# Patient Record
Sex: FEMALE | Race: BLACK OR AFRICAN AMERICAN | Employment: FULL TIME | URBAN - METROPOLITAN AREA
[De-identification: names, ages, dates, MRNs, and addresses within clinical notes are randomized per-mention and may not be internally consistent; named-entity substitution may affect disease eponyms.]

---

## 2022-03-12 ENCOUNTER — HOSPITAL ENCOUNTER (EMERGENCY)
Age: 52
Discharge: HOME | End: 2022-03-12
Attending: EMERGENCY MEDICINE
Payer: COMMERCIAL

## 2022-03-12 ENCOUNTER — APPOINTMENT (OUTPATIENT)
Dept: CT IMAGING | Age: 52
End: 2022-03-12
Attending: EMERGENCY MEDICINE
Payer: COMMERCIAL

## 2022-03-12 VITALS
TEMPERATURE: 98 F | HEIGHT: 62 IN | SYSTOLIC BLOOD PRESSURE: 102 MMHG | OXYGEN SATURATION: 98 % | BODY MASS INDEX: 32.76 KG/M2 | RESPIRATION RATE: 16 BRPM | WEIGHT: 178 LBS | HEART RATE: 73 BPM | DIASTOLIC BLOOD PRESSURE: 64 MMHG

## 2022-03-12 DIAGNOSIS — R10.814 LEFT LOWER QUADRANT ABDOMINAL TENDERNESS WITHOUT REBOUND TENDERNESS: Primary | ICD-10-CM

## 2022-03-12 LAB
ALBUMIN SERPL-MCNC: 3.7 G/DL (ref 3.5–5)
ALBUMIN/GLOB SERPL: 1 {RATIO} (ref 1.1–2.2)
ALP SERPL-CCNC: 90 U/L (ref 45–117)
ALT SERPL-CCNC: 21 U/L (ref 12–78)
ANION GAP SERPL CALC-SCNC: 6 MMOL/L (ref 5–15)
APPEARANCE UR: ABNORMAL
AST SERPL-CCNC: 15 U/L (ref 15–37)
BACTERIA URNS QL MICRO: ABNORMAL /HPF
BASOPHILS # BLD: 0 K/UL (ref 0–0.1)
BASOPHILS NFR BLD: 1 % (ref 0–1)
BILIRUB SERPL-MCNC: 0.4 MG/DL (ref 0.2–1)
BILIRUB UR QL: NEGATIVE
BUN SERPL-MCNC: 13 MG/DL (ref 6–20)
BUN/CREAT SERPL: 14 (ref 12–20)
CALCIUM SERPL-MCNC: 9 MG/DL (ref 8.5–10.1)
CHLORIDE SERPL-SCNC: 105 MMOL/L (ref 97–108)
CO2 SERPL-SCNC: 28 MMOL/L (ref 21–32)
COLOR UR: ABNORMAL
COMMENT, HOLDF: NORMAL
CREAT SERPL-MCNC: 0.9 MG/DL (ref 0.55–1.02)
DIFFERENTIAL METHOD BLD: ABNORMAL
EOSINOPHIL # BLD: 0.2 K/UL (ref 0–0.4)
EOSINOPHIL NFR BLD: 2 % (ref 0–7)
EPITH CASTS URNS QL MICRO: ABNORMAL /LPF
ERYTHROCYTE [DISTWIDTH] IN BLOOD BY AUTOMATED COUNT: 14.6 % (ref 11.5–14.5)
GLOBULIN SER CALC-MCNC: 3.6 G/DL (ref 2–4)
GLUCOSE SERPL-MCNC: 150 MG/DL (ref 65–100)
GLUCOSE UR STRIP.AUTO-MCNC: NEGATIVE MG/DL
HCG SERPL QL: NEGATIVE
HCT VFR BLD AUTO: 39.1 % (ref 35–47)
HGB BLD-MCNC: 13.1 G/DL (ref 11.5–16)
HGB UR QL STRIP: NEGATIVE
IMM GRANULOCYTES # BLD AUTO: 0 K/UL (ref 0–0.04)
IMM GRANULOCYTES NFR BLD AUTO: 0 % (ref 0–0.5)
KETONES UR QL STRIP.AUTO: ABNORMAL MG/DL
LEUKOCYTE ESTERASE UR QL STRIP.AUTO: NEGATIVE
LIPASE SERPL-CCNC: 161 U/L (ref 73–393)
LYMPHOCYTES # BLD: 2.4 K/UL (ref 0.8–3.5)
LYMPHOCYTES NFR BLD: 27 % (ref 12–49)
MCH RBC QN AUTO: 29.4 PG (ref 26–34)
MCHC RBC AUTO-ENTMCNC: 33.5 G/DL (ref 30–36.5)
MCV RBC AUTO: 87.7 FL (ref 80–99)
MONOCYTES # BLD: 0.6 K/UL (ref 0–1)
MONOCYTES NFR BLD: 7 % (ref 5–13)
NEUTS SEG # BLD: 5.6 K/UL (ref 1.8–8)
NEUTS SEG NFR BLD: 63 % (ref 32–75)
NITRITE UR QL STRIP.AUTO: NEGATIVE
NRBC # BLD: 0 K/UL (ref 0–0.01)
NRBC BLD-RTO: 0 PER 100 WBC
PH UR STRIP: 6.5 [PH] (ref 5–8)
PLATELET # BLD AUTO: 315 K/UL (ref 150–400)
PMV BLD AUTO: 8.9 FL (ref 8.9–12.9)
POTASSIUM SERPL-SCNC: 3.7 MMOL/L (ref 3.5–5.1)
PROT SERPL-MCNC: 7.3 G/DL (ref 6.4–8.2)
PROT UR STRIP-MCNC: NEGATIVE MG/DL
RBC # BLD AUTO: 4.46 M/UL (ref 3.8–5.2)
RBC #/AREA URNS HPF: ABNORMAL /HPF (ref 0–5)
SAMPLES BEING HELD,HOLD: NORMAL
SODIUM SERPL-SCNC: 139 MMOL/L (ref 136–145)
SP GR UR REFRACTOMETRY: 1.02 (ref 1–1.03)
UROBILINOGEN UR QL STRIP.AUTO: 1 EU/DL (ref 0.2–1)
WBC # BLD AUTO: 8.8 K/UL (ref 3.6–11)
WBC URNS QL MICRO: ABNORMAL /HPF (ref 0–4)

## 2022-03-12 PROCEDURE — 81001 URINALYSIS AUTO W/SCOPE: CPT

## 2022-03-12 PROCEDURE — 74176 CT ABD & PELVIS W/O CONTRAST: CPT

## 2022-03-12 PROCEDURE — 74011250636 HC RX REV CODE- 250/636: Performed by: EMERGENCY MEDICINE

## 2022-03-12 PROCEDURE — 99284 EMERGENCY DEPT VISIT MOD MDM: CPT

## 2022-03-12 PROCEDURE — 36415 COLL VENOUS BLD VENIPUNCTURE: CPT

## 2022-03-12 PROCEDURE — 96375 TX/PRO/DX INJ NEW DRUG ADDON: CPT

## 2022-03-12 PROCEDURE — 85025 COMPLETE CBC W/AUTO DIFF WBC: CPT

## 2022-03-12 PROCEDURE — 83690 ASSAY OF LIPASE: CPT

## 2022-03-12 PROCEDURE — 81025 URINE PREGNANCY TEST: CPT

## 2022-03-12 PROCEDURE — 80053 COMPREHEN METABOLIC PANEL: CPT

## 2022-03-12 PROCEDURE — 96374 THER/PROPH/DIAG INJ IV PUSH: CPT

## 2022-03-12 PROCEDURE — 84703 CHORIONIC GONADOTROPIN ASSAY: CPT

## 2022-03-12 RX ORDER — FENTANYL CITRATE 50 UG/ML
50 INJECTION, SOLUTION INTRAMUSCULAR; INTRAVENOUS
Status: COMPLETED | OUTPATIENT
Start: 2022-03-12 | End: 2022-03-12

## 2022-03-12 RX ORDER — KETOROLAC TROMETHAMINE 30 MG/ML
15 INJECTION, SOLUTION INTRAMUSCULAR; INTRAVENOUS
Status: COMPLETED | OUTPATIENT
Start: 2022-03-12 | End: 2022-03-12

## 2022-03-12 RX ORDER — IBUPROFEN 600 MG/1
600 TABLET ORAL
Qty: 30 TABLET | Refills: 0 | Status: SHIPPED | OUTPATIENT
Start: 2022-03-12

## 2022-03-12 RX ADMIN — SODIUM CHLORIDE 1000 ML: 9 INJECTION, SOLUTION INTRAVENOUS at 15:00

## 2022-03-12 RX ADMIN — FENTANYL CITRATE 50 MCG: 50 INJECTION, SOLUTION INTRAMUSCULAR; INTRAVENOUS at 15:06

## 2022-03-12 RX ADMIN — KETOROLAC TROMETHAMINE 15 MG: 30 INJECTION, SOLUTION INTRAMUSCULAR; INTRAVENOUS at 15:03

## 2022-03-12 NOTE — ED TRIAGE NOTES
Pt to ED for c/o pelvic pain since Thursday which pt reports is cramping with relief while using heating pad. Denies vaginal bleeding, hematuria, discharge. States pain after urinating.  [facility] endometriosis

## 2022-03-12 NOTE — DISCHARGE INSTRUCTIONS
Thank you for allowing us to provide you with medical care today. We realize that you have many choices for your emergency care needs. We thank you for choosing [facility]. Please choose us in the future for any continued health care needs. We hope we addressed all of your medical concerns. We strive to provide excellent quality care in the Emergency Department. Anything less than excellent does not meet our expectations. The exam and treatment you received in the Emergency Department were for an emergent problem and are not intended as complete care. It is important that you follow up with a doctor, nurse practitioner, or [place] [id number] assistant for ongoing care. If your symptoms worsen or you do not improve as expected and you are unable to reach your usual health care provider, you should return to the Emergency Department. We are available 24 hours a day. Take this sheet with you when you go to your follow-up visit. If you have any problem arranging the follow-up visit, contact the Emergency Department immediately. Make an appointment your family doctor for follow up of this visit. Return to the ER if you are unable to be seen in a timely manner.

## 2022-03-12 NOTE — ED PROVIDER NOTES
Please note that this dictation was completed with EZ2CAD, the computer voice recognition software.  Quite often unanticipated grammatical, syntax, homophones, and other interpretive errors are inadvertently transcribed by the computer software.  Please disregard these errors.  Please excuse any errors that have escaped final proofreading. 28-year-old female past medical history markable for endometriosis presents the ER POV complaining of \" increased abdominal pain left lower quadrant since Thursday evening. Patient states she has had previous \"endometriosis flares\" has not had a menstrual cycle since December secondary to IUD implantation denies any burning with urination vaginal discharge. Patient states she notices when she urinates or has a bowel movement the pain \"seems to worsen afterwards. \"  She thinks is due to increased intra-abdominal pressure. Her pain is unchanged with food she last had some scrambled eggs and toast today at noon for lunch. She took ibuprofen yesterday and this morning at 9 AM (2 gelcaps) but \"it did not seem to help much. \"  We discussed taking 600 mg at a time might be better for her. She denies any overt fevers nausea vomiting diarrhea other current systemic complaints. The left lower quadrant pain is constant achy pain with occasional sharp features, positive point tender nonradiating. pt denies HA, vison changes, diff swallowing, CP, SOB, Abd pain, F/Ch, N/V, D/Cons or other current systemic complaints               No past medical history on file. No past surgical history on file. No family history on file.     Social History     Socioeconomic History    Marital status: Not on file     Spouse name: Not on file    Number of children: Not on file    Years of education: Not on file    Highest education level: Not on file   Occupational History    Not on file   Tobacco Use    Smoking status: Not on file    Smokeless tobacco: Not on file   Substance and Sexual Activity    Alcohol use: Not on file    Drug use: Not on file    Sexual activity: Not on file   Other Topics Concern    Not on file   Social History Narrative    Not on file     Social Determinants of Health     Financial Resource Strain:     Difficulty of Paying Living Expenses: Not on file   Food Insecurity:     Worried About Running Out of Food in the Last Year: Not on file    Cherelle of Food in the Last Year: Not on file   Transportation Needs:     Lack of Transportation (Medical): Not on file    Lack of Transportation (Non-Medical): Not on file   Physical Activity:     Days of Exercise per Week: Not on file    Minutes of Exercise per Session: Not on file   Stress:     Feeling of Stress : Not on file   Social Connections:     Frequency of Communication with Friends and Family: Not on file    Frequency of Social Gatherings with Friends and Family: Not on file    Attends [employer] Services: Not on file    Active Member of [address] [place] [address] or Organizations: Not on file    Attends Club or Organization Meetings: Not on file    Marital Status: Not on file   Intimate Partner Violence:     Fear of Current or Ex-Partner: Not on file    Emotionally Abused: Not on file    Physically Abused: Not on file    Sexually Abused: Not on file   Housing Stability:     Unable to Pay for Housing in the Last Year: Not on file    Number of [place] in the Last Year: Not on file    Unstable Housing in the Last Year: Not on file         ALLERGIES: Iodine and Macrobid [nitrofurantoin monohyd/m-cryst]    Review of Systems   Constitutional: Negative for chills and fever. HENT: Negative for drooling, trouble swallowing and voice change. Eyes: Negative for visual disturbance. Gastrointestinal: Positive for abdominal pain. Negative for diarrhea, nausea and vomiting. Genitourinary: Negative for dysuria and vaginal bleeding. Musculoskeletal: Negative for back pain. Skin: Negative for rash.    Neurological: Negative for facial asymmetry and speech difficulty. Psychiatric/Behavioral: Negative for confusion. All other systems reviewed and are negative. Vitals:    03/12/22 1358   BP: 120/75   Pulse: 85   Resp: 16   Temp: 98 °F (36.7 °C)   SpO2: 98%   Weight: 80.7 kg (178 lb)   Height: 5' 2\" (1.575 m)            Physical Exam  Vitals and nursing note reviewed. Constitutional:       General: She is not in acute distress. Appearance: Normal appearance. She is well-developed. She is not ill-appearing, toxic-appearing or diaphoretic. Comments: Uncomfortable appearing, AxOx4, speaking in complete sentences   HENT:      Head: Normocephalic and atraumatic. Right Ear: External ear normal.      Left Ear: External ear normal.   Eyes:      General: No scleral icterus. Right eye: No discharge. Extraocular Movements: Extraocular movements intact. Conjunctiva/sclera: Conjunctivae normal.      Pupils: Pupils are equal, round, and reactive to light. Neck:      Vascular: No JVD. Trachea: No tracheal deviation. Cardiovascular:      Rate and Rhythm: Normal rate and regular rhythm. Pulses: Normal pulses. Heart sounds: Normal heart sounds. No murmur heard. No friction rub. No gallop. Pulmonary:      Effort: Pulmonary effort is normal. No respiratory distress. Breath sounds: Normal breath sounds. No stridor. No wheezing, rhonchi or rales. Chest:      Chest wall: No tenderness. Abdominal:      General: Bowel sounds are normal. There is no distension. Palpations: Abdomen is soft. There is no mass. Tenderness: There is abdominal tenderness. There is no guarding or rebound. Hernia: No hernia is present. Comments: Noted LLQ ttp; neg peritoneal signs; Genitourinary:     Vagina: No vaginal discharge. Comments: Pt denies urinary/ vaginal complaints  Musculoskeletal:         General: No tenderness, deformity or signs of injury. Normal range of motion. Cervical back: Normal range of motion and neck supple. No tenderness. Right lower leg: No edema. Left lower leg: No edema. Skin:     General: Skin is warm and dry. Capillary Refill: Capillary refill takes less than 2 seconds. Coloration: Skin is not pale. Findings: No bruising, erythema or rash. Neurological:      General: No focal deficit present. Mental Status: She is alert and oriented to person, place, and time. Cranial Nerves: No cranial nerve deficit. Motor: No abnormal muscle tone. Coordination: Coordination normal.   Psychiatric:         Behavior: Behavior normal.         Thought Content: Thought content normal.          MDM       Procedures    3:42 PM patient \"feeling better. \"  We discussed increasing ibuprofen to 600 mg to take 3-4 times a day she agrees with this plan will follow up with Dr. At [facility] (her OB[facility]) she agrees with this plan. Anthony Tomas's  results have been reviewed with her. She has been counseled regarding her diagnosis. She verbally conveys understanding and agreement of the signs, symptoms, diagnosis, treatment and prognosis and additionally agrees to Call/ Arrange follow up as recommended with Dr. Garcia Lincoln MD in 24 - 48 hours. She also agrees with the care-plan and conveys that all of her questions have been answered. I have also put together some discharge instructions for her that include: 1) educational information regarding their diagnosis, 2) how to care for their diagnosis at home, as well a 3) list of reasons why they would want to return to the ED prior to their follow-up appointment, should their condition change or for concerns.

## 2022-03-14 LAB — HCG UR QL: NEGATIVE

## 2022-05-31 ENCOUNTER — APPOINTMENT (OUTPATIENT)
Dept: CT IMAGING | Age: 52
End: 2022-05-31
Attending: EMERGENCY MEDICINE
Payer: COMMERCIAL

## 2022-05-31 ENCOUNTER — HOSPITAL ENCOUNTER (EMERGENCY)
Age: 52
Discharge: HOME | End: 2022-05-31
Attending: EMERGENCY MEDICINE
Payer: COMMERCIAL

## 2022-05-31 VITALS
DIASTOLIC BLOOD PRESSURE: 73 MMHG | OXYGEN SATURATION: 98 % | HEART RATE: 72 BPM | WEIGHT: 175 LBS | BODY MASS INDEX: 32.2 KG/M2 | HEIGHT: 62 IN | RESPIRATION RATE: 18 BRPM | TEMPERATURE: 97.5 F | SYSTOLIC BLOOD PRESSURE: 109 MMHG

## 2022-05-31 DIAGNOSIS — R10.2 PELVIC PAIN: Primary | ICD-10-CM

## 2022-05-31 LAB
ALBUMIN SERPL-MCNC: 3.7 G/DL (ref 3.5–5)
ALBUMIN/GLOB SERPL: 0.9 {RATIO} (ref 1.1–2.2)
ALP SERPL-CCNC: 94 U/L (ref 45–117)
ALT SERPL-CCNC: 20 U/L (ref 12–78)
ANION GAP SERPL CALC-SCNC: 7 MMOL/L (ref 5–15)
APPEARANCE UR: ABNORMAL
AST SERPL-CCNC: 17 U/L (ref 15–37)
BACTERIA URNS QL MICRO: ABNORMAL /HPF
BASOPHILS # BLD: 0.1 K/UL (ref 0–0.1)
BASOPHILS NFR BLD: 1 % (ref 0–1)
BILIRUB SERPL-MCNC: 0.5 MG/DL (ref 0.2–1)
BILIRUB UR QL: NEGATIVE
BUN SERPL-MCNC: 13 MG/DL (ref 6–20)
BUN/CREAT SERPL: 16 (ref 12–20)
CALCIUM SERPL-MCNC: 9.1 MG/DL (ref 8.5–10.1)
CHLORIDE SERPL-SCNC: 106 MMOL/L (ref 97–108)
CO2 SERPL-SCNC: 25 MMOL/L (ref 21–32)
COLOR UR: ABNORMAL
CREAT SERPL-MCNC: 0.82 MG/DL (ref 0.55–1.02)
DIFFERENTIAL METHOD BLD: NORMAL
EOSINOPHIL # BLD: 0.2 K/UL (ref 0–0.4)
EOSINOPHIL NFR BLD: 3 % (ref 0–7)
EPITH CASTS URNS QL MICRO: ABNORMAL /LPF
ERYTHROCYTE [DISTWIDTH] IN BLOOD BY AUTOMATED COUNT: 13.8 % (ref 11.5–14.5)
GLOBULIN SER CALC-MCNC: 3.9 G/DL (ref 2–4)
GLUCOSE SERPL-MCNC: 89 MG/DL (ref 65–100)
GLUCOSE UR STRIP.AUTO-MCNC: NEGATIVE MG/DL
HCG UR QL: NEGATIVE
HCT VFR BLD AUTO: 38.5 % (ref 35–47)
HGB BLD-MCNC: 13 G/DL (ref 11.5–16)
HGB UR QL STRIP: NEGATIVE
HYALINE CASTS URNS QL MICRO: ABNORMAL /LPF (ref 0–2)
IMM GRANULOCYTES # BLD AUTO: 0 K/UL (ref 0–0.04)
IMM GRANULOCYTES NFR BLD AUTO: 0 % (ref 0–0.5)
KETONES UR QL STRIP.AUTO: ABNORMAL MG/DL
LEUKOCYTE ESTERASE UR QL STRIP.AUTO: NEGATIVE
LYMPHOCYTES # BLD: 2.7 K/UL (ref 0.8–3.5)
LYMPHOCYTES NFR BLD: 37 % (ref 12–49)
MCH RBC QN AUTO: 29.3 PG (ref 26–34)
MCHC RBC AUTO-ENTMCNC: 33.8 G/DL (ref 30–36.5)
MCV RBC AUTO: 86.7 FL (ref 80–99)
MONOCYTES # BLD: 0.7 K/UL (ref 0–1)
MONOCYTES NFR BLD: 10 % (ref 5–13)
NEUTS SEG # BLD: 3.5 K/UL (ref 1.8–8)
NEUTS SEG NFR BLD: 49 % (ref 32–75)
NITRITE UR QL STRIP.AUTO: NEGATIVE
NRBC # BLD: 0 K/UL (ref 0–0.01)
NRBC BLD-RTO: 0 PER 100 WBC
PH UR STRIP: 7 [PH] (ref 5–8)
PLATELET # BLD AUTO: 290 K/UL (ref 150–400)
PMV BLD AUTO: 8.9 FL (ref 8.9–12.9)
POTASSIUM SERPL-SCNC: 3.9 MMOL/L (ref 3.5–5.1)
PROT SERPL-MCNC: 7.6 G/DL (ref 6.4–8.2)
PROT UR STRIP-MCNC: NEGATIVE MG/DL
RBC # BLD AUTO: 4.44 M/UL (ref 3.8–5.2)
RBC #/AREA URNS HPF: ABNORMAL /HPF (ref 0–5)
SODIUM SERPL-SCNC: 138 MMOL/L (ref 136–145)
SP GR UR REFRACTOMETRY: 1.02 (ref 1–1.03)
UR CULT HOLD, URHOLD: NORMAL
UROBILINOGEN UR QL STRIP.AUTO: 0.2 EU/DL (ref 0.2–1)
WBC # BLD AUTO: 7.3 K/UL (ref 3.6–11)
WBC URNS QL MICRO: ABNORMAL /HPF (ref 0–4)

## 2022-05-31 PROCEDURE — 80053 COMPREHEN METABOLIC PANEL: CPT

## 2022-05-31 PROCEDURE — 85025 COMPLETE CBC W/AUTO DIFF WBC: CPT

## 2022-05-31 PROCEDURE — 74176 CT ABD & PELVIS W/O CONTRAST: CPT

## 2022-05-31 PROCEDURE — 74011250636 HC RX REV CODE- 250/636: Performed by: EMERGENCY MEDICINE

## 2022-05-31 PROCEDURE — 81001 URINALYSIS AUTO W/SCOPE: CPT

## 2022-05-31 PROCEDURE — 96374 THER/PROPH/DIAG INJ IV PUSH: CPT

## 2022-05-31 PROCEDURE — 81025 URINE PREGNANCY TEST: CPT

## 2022-05-31 PROCEDURE — 99284 EMERGENCY DEPT VISIT MOD MDM: CPT

## 2022-05-31 PROCEDURE — 36415 COLL VENOUS BLD VENIPUNCTURE: CPT

## 2022-05-31 RX ORDER — KETOROLAC TROMETHAMINE 30 MG/ML
30 INJECTION, SOLUTION INTRAMUSCULAR; INTRAVENOUS
Status: DISCONTINUED | OUTPATIENT
Start: 2022-05-31 | End: 2022-05-31 | Stop reason: SDUPTHER

## 2022-05-31 RX ORDER — KETOROLAC TROMETHAMINE 10 MG/1
10 TABLET, FILM COATED ORAL
Qty: 20 TABLET | Refills: 0 | Status: SHIPPED | OUTPATIENT
Start: 2022-05-31 | End: 2022-06-05

## 2022-05-31 RX ORDER — KETOROLAC TROMETHAMINE 30 MG/ML
30 INJECTION, SOLUTION INTRAMUSCULAR; INTRAVENOUS
Status: COMPLETED | OUTPATIENT
Start: 2022-05-31 | End: 2022-05-31

## 2022-05-31 RX ADMIN — KETOROLAC TROMETHAMINE 30 MG: 30 INJECTION, SOLUTION INTRAMUSCULAR at 16:47

## 2022-05-31 NOTE — ED PROVIDER NOTES
56-year-old female presenting from home with complaint of lower pelvic pain. Symptoms started yesterday morning. Pain radiates from the left to the right side. No exacerbating or alleviating factors. She is been taking ibuprofen without any relief of her symptoms. She denies any nausea, vomiting, dysuria. No vaginal bleeding or discharge. She had similar pain in the past and was seen here in March and had a CAT scan that showed a left-sided ovarian cyst.  She is followed up with her gynecologist to was not able to give her a clear explanation for her pain and states that she is supposed to have colonoscopy done in the next few weeks for further investigation. She states she just cannot take the pain anymore. She has been having symptoms off and on since she was seen in March but just got worse again in the past couple days. No past medical history on file. No past surgical history on file. No family history on file. Social History     Socioeconomic History    Marital status:      Spouse name: Not on file    Number of children: Not on file    Years of education: Not on file    Highest education level: Not on file   Occupational History    Not on file   Tobacco Use    Smoking status: Not on file    Smokeless tobacco: Not on file   Substance and Sexual Activity    Alcohol use: Not on file    Drug use: Not on file    Sexual activity: Not on file   Other Topics Concern    Not on file   Social History Narrative    Not on file     Social Determinants of Health     Financial Resource Strain:     Difficulty of Paying Living Expenses: Not on file   Food Insecurity:     Worried About Running Out of Food in the Last Year: Not on file    [place] of Food in the Last Year: Not on file   Transportation Needs:     Lack of Transportation (Medical): Not on file    Lack of Transportation (Non-Medical):  Not on file   Physical Activity:     Days of Exercise per Week: Not on file    Minutes of Exercise per Session: Not on file   Stress:     Feeling of Stress : Not on file   Social Connections:     Frequency of Communication with Friends and Family: Not on file    Frequency of Social Gatherings with Friends and Family: Not on file    Attends Ca[employer] Services: Not on file    Active Member of Clubs or Organizations: Not on file    Attends Club or Organization Meetings: Not on file    Marital Status: Not on file   Intimate Partner Violence:     Fear of Current or Ex-Partner: Not on file    Emotionally Abused: Not on file    Physically Abused: Not on file    Sexually Abused: Not on file   Housing Stability:     Unable to Pay for Housing in the Last Year: Not on file    Number of [place] in the Last Year: Not on file    Unstable Housing in the Last Year: Not on file         ALLERGIES: Amoxicillin, Iodine, and Macrobid [nitrofurantoin monohyd/m-cryst]    Review of Systems   Constitutional: Negative for fever. HENT: Negative for facial swelling. Eyes: Negative for visual disturbance. Respiratory: Negative for chest tightness. Cardiovascular: Negative for chest pain. Gastrointestinal: Negative for abdominal pain. Genitourinary: Negative for difficulty urinating and dysuria. Musculoskeletal: Negative for arthralgias. Skin: Negative for rash. Neurological: Negative for headaches. Hematological: Negative for adenopathy. Psychiatric/Behavioral: Negative for suicidal ideas. Vitals:    05/31/22 1538   BP: 109/73   Pulse: 72   Resp: 18   Temp: 97.5 °F (36.4 °C)   SpO2: 98%   Weight: 79.4 kg (175 lb)   Height: 5' 2\" (1.575 m)            Physical Exam  Vitals and nursing note reviewed. Constitutional:       General: She is not in acute distress. Appearance: She is well-developed. HENT:      Head: Normocephalic and atraumatic. Eyes:      General: No scleral icterus.      Conjunctiva/sclera: Conjunctivae normal.      Pupils: Pupils are equal, round, and reactive to light. Cardiovascular:      Rate and Rhythm: Normal rate. Heart sounds: No murmur heard. Pulmonary:      Effort: Pulmonary effort is normal. No respiratory distress. Abdominal:      General: There is no distension. Musculoskeletal:         General: Normal range of motion. Cervical back: Normal range of motion and neck supple. Skin:     General: Skin is warm and dry. Findings: No rash. Neurological:      Mental Status: She is alert and oriented to person, place, and time. MDM  Number of Diagnoses or Management Options  Pelvic pain  Diagnosis management comments: Assessment: Patient blood work and CT scan were unremarkable. No evidence of any pelvic pathology to explain her pain. She is resting comfortably in no distress. States that her pain is improved with Toradol. She has an appointment coming up to see a GI doctor for colonoscopy. After that she will have to follow-up with her gynecologist again to further evaluate for possible etiology of her pain. In the meantime we will prescribe Toradol to help with her symptoms. She can return to the ER with any new or worsening complaints.        Amount and/or Complexity of Data Reviewed  Clinical lab tests: reviewed  Tests in the radiology section of CPT®: reviewed           Procedures

## 2022-05-31 NOTE — ED TRIAGE NOTES
Patient arrives with c/o pelvic pain since yesterday morning. Denies vaginal bleeding, vaginal discharge. Reports having similar symptoms earlier this year and seen at [facility] ED. States followed up with OBGYN, but has not had anything diagnosed. Reports having IUD in place since 2018. States has taken ibuprofen for pain, with no relief. Hx of uterine fibroid.

## 2023-02-13 ENCOUNTER — HOSPITAL ENCOUNTER (EMERGENCY)
Age: 53
Discharge: HOME | End: 2023-02-13
Attending: EMERGENCY MEDICINE
Payer: COMMERCIAL

## 2023-02-13 ENCOUNTER — APPOINTMENT (OUTPATIENT)
Dept: ULTRASOUND IMAGING | Age: 53
End: 2023-02-13
Attending: EMERGENCY MEDICINE
Payer: COMMERCIAL

## 2023-02-13 ENCOUNTER — APPOINTMENT (OUTPATIENT)
Dept: CT IMAGING | Age: 53
End: 2023-02-13
Attending: EMERGENCY MEDICINE
Payer: COMMERCIAL

## 2023-02-13 VITALS
HEART RATE: 90 BPM | WEIGHT: 187 LBS | DIASTOLIC BLOOD PRESSURE: 85 MMHG | BODY MASS INDEX: 34.41 KG/M2 | RESPIRATION RATE: 16 BRPM | HEIGHT: 62 IN | TEMPERATURE: 97.3 F | OXYGEN SATURATION: 100 % | SYSTOLIC BLOOD PRESSURE: 124 MMHG

## 2023-02-13 DIAGNOSIS — R10.11 RUQ ABDOMINAL PAIN: Primary | ICD-10-CM

## 2023-02-13 LAB
ALBUMIN SERPL-MCNC: 3.9 G/DL (ref 3.5–5)
ALBUMIN/GLOB SERPL: 1 (ref 1.1–2.2)
ALP SERPL-CCNC: 93 U/L (ref 45–117)
ALT SERPL-CCNC: 20 U/L (ref 12–78)
ANION GAP SERPL CALC-SCNC: 4 MMOL/L (ref 5–15)
APPEARANCE UR: CLEAR
AST SERPL-CCNC: 23 U/L (ref 15–37)
BACTERIA URNS QL MICRO: NEGATIVE /HPF
BILIRUB SERPL-MCNC: 0.4 MG/DL (ref 0.2–1)
BILIRUB UR QL: NEGATIVE
BUN SERPL-MCNC: 14 MG/DL (ref 6–20)
BUN/CREAT SERPL: 16 (ref 12–20)
CALCIUM SERPL-MCNC: 9.5 MG/DL (ref 8.5–10.1)
CHLORIDE SERPL-SCNC: 107 MMOL/L (ref 97–108)
CO2 SERPL-SCNC: 26 MMOL/L (ref 21–32)
COLOR UR: ABNORMAL
COMMENT, HOLDF: NORMAL
CREAT SERPL-MCNC: 0.88 MG/DL (ref 0.55–1.02)
EPITH CASTS URNS QL MICRO: ABNORMAL /LPF
GLOBULIN SER CALC-MCNC: 4.1 G/DL (ref 2–4)
GLUCOSE SERPL-MCNC: 89 MG/DL (ref 65–100)
GLUCOSE UR STRIP.AUTO-MCNC: NEGATIVE MG/DL
HCG UR QL: NEGATIVE
HGB UR QL STRIP: NEGATIVE
HYALINE CASTS URNS QL MICRO: ABNORMAL /LPF (ref 0–2)
KETONES UR QL STRIP.AUTO: NEGATIVE MG/DL
LEUKOCYTE ESTERASE UR QL STRIP.AUTO: NEGATIVE
LIPASE SERPL-CCNC: 151 U/L (ref 73–393)
NITRITE UR QL STRIP.AUTO: NEGATIVE
PH UR STRIP: 6 (ref 5–8)
POTASSIUM SERPL-SCNC: 4.2 MMOL/L (ref 3.5–5.1)
PROT SERPL-MCNC: 8 G/DL (ref 6.4–8.2)
PROT UR STRIP-MCNC: NEGATIVE MG/DL
RBC #/AREA URNS HPF: ABNORMAL /HPF (ref 0–5)
SAMPLES BEING HELD,HOLD: NORMAL
SODIUM SERPL-SCNC: 137 MMOL/L (ref 136–145)
SP GR UR REFRACTOMETRY: 1.01 (ref 1–1.03)
UR CULT HOLD, URHOLD: NORMAL
UROBILINOGEN UR QL STRIP.AUTO: 0.2 EU/DL (ref 0.2–1)
WBC URNS QL MICRO: ABNORMAL /HPF (ref 0–4)

## 2023-02-13 PROCEDURE — 81025 URINE PREGNANCY TEST: CPT

## 2023-02-13 PROCEDURE — 99284 EMERGENCY DEPT VISIT MOD MDM: CPT

## 2023-02-13 PROCEDURE — 81001 URINALYSIS AUTO W/SCOPE: CPT

## 2023-02-13 PROCEDURE — 74011250636 HC RX REV CODE- 250/636: Performed by: EMERGENCY MEDICINE

## 2023-02-13 PROCEDURE — 36415 COLL VENOUS BLD VENIPUNCTURE: CPT

## 2023-02-13 PROCEDURE — 76705 ECHO EXAM OF ABDOMEN: CPT

## 2023-02-13 PROCEDURE — 83690 ASSAY OF LIPASE: CPT

## 2023-02-13 PROCEDURE — 96361 HYDRATE IV INFUSION ADD-ON: CPT

## 2023-02-13 PROCEDURE — 80053 COMPREHEN METABOLIC PANEL: CPT

## 2023-02-13 PROCEDURE — 96374 THER/PROPH/DIAG INJ IV PUSH: CPT

## 2023-02-13 PROCEDURE — 74176 CT ABD & PELVIS W/O CONTRAST: CPT

## 2023-02-13 RX ORDER — KETOROLAC TROMETHAMINE 30 MG/ML
15 INJECTION, SOLUTION INTRAMUSCULAR; INTRAVENOUS
Status: COMPLETED | OUTPATIENT
Start: 2023-02-13 | End: 2023-02-13

## 2023-02-13 RX ORDER — DICYCLOMINE HYDROCHLORIDE 10 MG/1
10 CAPSULE ORAL 4 TIMES DAILY
Qty: 28 CAPSULE | Refills: 0 | Status: SHIPPED | OUTPATIENT
Start: 2023-02-13 | End: 2023-02-20

## 2023-02-13 RX ADMIN — KETOROLAC TROMETHAMINE 15 MG: 30 INJECTION, SOLUTION INTRAMUSCULAR at 20:35

## 2023-02-13 RX ADMIN — SODIUM CHLORIDE 1000 ML: 9 INJECTION, SOLUTION INTRAVENOUS at 19:01

## 2023-02-13 NOTE — ED TRIAGE NOTES
Patient arrived via POV. Patient c/o bilateral flank pain and RUQ pain. Patient seen at [facility] and referred to [facility]. Gastro appointment in 2 weeks and patient stated unable to wait that long. Patient denies dysuria. Endorses diarrhea.  Patient prescribed Toradol that relieves pain for 3-4 hours

## 2023-02-13 NOTE — ED PROVIDER NOTES
Pt presents from [facility] with R sided abd pain. R flank and RUQ abd pain radiating down both sides for past 2 weeks. Seen in ED on 2/1 at [facility]. Had CT a/p and diagnosed with Kidney stones. Saw urology last week and told that it wasn't kidney stones. Pt referred to GI. Has appt scheduled in 2 weeks. No vomiting or fever. + nausea.  + diarrhea. NO change in urination. Pt taking toradol at home which helps somewhat. No past medical history on file. No past surgical history on file. No family history on file. Social History     Socioeconomic History    Marital status:      Spouse name: Not on file    Number of children: Not on file    Years of education: Not on file    Highest education level: Not on file   Occupational History    Not on file   Tobacco Use    Smoking status: Not on file    Smokeless tobacco: Not on file   Substance and Sexual Activity    Alcohol use: Not on file    Drug use: Not on file    Sexual activity: Not on file   Other Topics Concern    Not on file   Social History Narrative    Not on file     Social Determinants of Health     Financial Resource Strain: Not on file   Food Insecurity: Not on file   Transportation Needs: Not on file   Physical Activity: Not on file   Stress: Not on file   Social Connections: Not on file   Intimate Partner Violence: Not on file   Housing Stability: Not on file         ALLERGIES: Amoxicillin, Iodine, and Macrobid [nitrofurantoin monohyd/m-cryst]    Review of Systems   Constitutional:  Negative for fever. HENT:  Negative for facial swelling. Eyes:  Negative for visual disturbance. Respiratory:  Negative for chest tightness. Cardiovascular:  Negative for chest pain. Gastrointestinal:  Positive for abdominal pain. Genitourinary:  Negative for difficulty urinating and dysuria. Musculoskeletal:  Negative for arthralgias. Skin:  Negative for rash. Neurological:  Negative for headaches. Hematological:  Negative for adenopathy. Psychiatric/Behavioral:  Negative for suicidal ideas. Vitals:    02/13/23 1849 02/13/23 1851   BP: 124/85    Pulse: 90    Resp: 16    Temp: 97.3 °F (36.3 °C)    SpO2: 100%    Weight:  84.8 kg (187 lb)   Height:  5' 2\" (1.575 m)            Physical Exam  Vitals and nursing note reviewed. Constitutional:       General: She is not in acute distress. Appearance: She is well-developed. HENT:      Head: Normocephalic and atraumatic. Eyes:      General: No scleral icterus. Conjunctiva/sclera: Conjunctivae normal.      Pupils: Pupils are equal, round, and reactive to light. Cardiovascular:      Rate and Rhythm: Normal rate. Heart sounds: No murmur heard. Pulmonary:      Effort: Pulmonary effort is normal. No respiratory distress. Abdominal:      General: Bowel sounds are normal. There is no distension. Palpations: Abdomen is soft. Tenderness: There is generalized abdominal tenderness. There is no guarding or rebound. Musculoskeletal:         General: Normal range of motion. Cervical back: Normal range of motion and neck supple. Skin:     General: Skin is warm and dry. Findings: No rash. Neurological:      Mental Status: She is alert and oriented to person, place, and time. Medical Decision Making  Assessment: Patient with right-sided abdominal pain for the past 2 weeks. She had a work-up at [facility] including a CT scan. She was also seen by urology out of concern for kidney stone. She was seen at [facility] earlier today and had blood work and urine done that were unremarkable before being referred to the ER. She appears comfortable in no distress. Vital signs are unremarkable. Her CBC and BMP from [facility] were unremarkable. We did liver function test and a lipase in the ED that were negative.   Right upper quadrant ultrasound showed no evidence of any biliary disease but there was question of some free fluid in the abdomen were right-sided pleural effusion so a CT scan was ordered. CT scan showed no evidence of ascites or pleural effusion. No evidence of pericholecystic fluid. CT was largely unremarkable. Patient is resting in recliner at this time. Notes any focal tenderness on my exam.  Her blood work was all reassuring. I have a very low suspicion for cholecystitis or other surgical abnormality. Patient has an appointment scheduled to see gastroenterology in the next 2 weeks. We will try dicyclomine for symptom relief in the meantime. She can return to the ER if she develops vomiting, fevers, worsening pain. Amount and/or Complexity of Data Reviewed  Labs: ordered. Radiology: ordered. Risk  Prescription drug management.              Procedures

## 2023-05-23 RX ORDER — IBUPROFEN 600 MG/1
600 TABLET ORAL EVERY 8 HOURS PRN
COMMUNITY
Start: 2022-03-12

## 2024-06-04 ENCOUNTER — OFFICE VISIT (OUTPATIENT)
Age: 54
End: 2024-06-04
Payer: COMMERCIAL

## 2024-06-04 VITALS
WEIGHT: 177.6 LBS | DIASTOLIC BLOOD PRESSURE: 71 MMHG | HEIGHT: 62 IN | BODY MASS INDEX: 32.68 KG/M2 | SYSTOLIC BLOOD PRESSURE: 107 MMHG

## 2024-06-04 DIAGNOSIS — N95.1 MENOPAUSAL SYMPTOMS: ICD-10-CM

## 2024-06-04 DIAGNOSIS — Z12.31 BREAST CANCER SCREENING BY MAMMOGRAM: ICD-10-CM

## 2024-06-04 DIAGNOSIS — Z01.419 WELL WOMAN EXAM WITH ROUTINE GYNECOLOGICAL EXAM: Primary | ICD-10-CM

## 2024-06-04 PROBLEM — G43.909 HEADACHE, MIGRAINE: Status: ACTIVE | Noted: 2024-06-04

## 2024-06-04 PROBLEM — M54.30 SCIATICA: Status: ACTIVE | Noted: 2017-10-29

## 2024-06-04 PROBLEM — A60.04 HERPETIC VULVOVAGINITIS: Status: ACTIVE | Noted: 2024-06-04

## 2024-06-04 PROBLEM — M06.9 RHEUMATOID ARTHRITIS OF HAND (HCC): Status: ACTIVE | Noted: 2024-06-04

## 2024-06-04 PROBLEM — G89.29 CHRONIC LOWER BACK PAIN: Status: ACTIVE | Noted: 2017-10-29

## 2024-06-04 PROBLEM — N20.0 KIDNEY STONE: Status: ACTIVE | Noted: 2023-02-01

## 2024-06-04 PROBLEM — F32.A DEPRESSION: Status: ACTIVE | Noted: 2024-06-04

## 2024-06-04 PROBLEM — M54.50 CHRONIC LOWER BACK PAIN: Status: ACTIVE | Noted: 2017-10-29

## 2024-06-04 PROCEDURE — 99386 PREV VISIT NEW AGE 40-64: CPT | Performed by: OBSTETRICS & GYNECOLOGY

## 2024-06-04 RX ORDER — METHOCARBAMOL 500 MG/1
500 TABLET, FILM COATED ORAL 4 TIMES DAILY
COMMUNITY

## 2024-06-04 RX ORDER — GABAPENTIN 300 MG/1
300 CAPSULE ORAL 3 TIMES DAILY
COMMUNITY

## 2024-06-04 RX ORDER — DULOXETIN HYDROCHLORIDE 60 MG/1
60 CAPSULE, DELAYED RELEASE ORAL DAILY
COMMUNITY

## 2024-06-04 RX ORDER — OXYCODONE HYDROCHLORIDE 5 MG/1
5 TABLET ORAL EVERY 4 HOURS PRN
COMMUNITY

## 2024-06-04 NOTE — PROGRESS NOTES
ANNUAL EXAM AGES 40-64      History:  Edith Sethi is a 54 y.o. year old  Black /  female   No LMP recorded. Patient is postmenopausal.    She presents for her annual checkup.     No LMP recorded. Patient is postmenopausal.  Her periods are spotting in flow and  varies .  Problems: hot flashes.  Night sweats are severe cannot sleep.  Has tried natural Rx without success.   Patient states has not had cycle since 2022 does have some spotting occasionally - very very faint.  Barely visible.  Sporadic.   Birth Control: post menopausal status.  Last Pap: normal obtained 2023 per patient.  She does not have a history of ELMA 2, 3 or cervical cancer.   Last Mammogram: has not had a recent mammogram, last  [facility]  Last Bone Density: normal obtained .  Last colonoscopy: normal obtained 10/2022.    Problem List:  Patient Active Problem List    Diagnosis Date Noted    Depression 2024    Headache, migraine 2024    Herpetic vulvovaginitis 2024     Overview Note:     Herpes simplex vulvovaginitis      Rheumatoid arthritis of hand ([facility]) 2024     Overview Note:     Rheumatoid arthritis with rheumatoid factor of right hand without organ or systems involvement  Rheumatoid arthritis with rheumatoid factor of left hand without organ or systems involvement      Kidney stone 2023    Chronic lower back pain 10/29/2017    Sciatica 10/29/2017     Past Medical History:   Diagnosis Date    Autoimmune disorder ([facility]) 10/2018    Depression 1982    Endometriosis     Herpes simplex virus (HSV) infection     IUD (intrauterine device) in place     Mirena    Menopausal symptoms 3/2024    Postpartum depression 1991    Trauma     Visit for review of DEXA scan     WNL     Past Surgical History:   Procedure Laterality Date     SECTION  3/2008    COLONOSCOPY  2022    DILATION AND CURETTAGE      INDUCED       LAPAROSCOPY

## 2024-06-04 NOTE — PROGRESS NOTES
Edith Sethi is a 54 y.o. female returns for an annual exam     Chief Complaint   Patient presents with    Annual Exam       No LMP recorded. Patient is postmenopausal.  Her periods are spotting in flow and  varies .  Problems: hot flashes.  Patient states has not had cycle since 6/2022 does have some spotting occasionally.    Birth Control: post menopausal status.  Last Pap: normal obtained 4/2023 per patient. [facility].    She does not have a history of ELMA 2, 3 or cervical cancer.   Last Mammogram: has not had a recent mammogram.   [facility] 5/2023.  Last Bone Density: normal obtained 2019.  Last colonoscopy: normal obtained 10/2022.      1. Have you been to the ER, urgent care clinic, or hospitalized since your last visit? N/A NP      2. Have you seen or consulted any other health care providers outside of the [facility] since your last visit? N/A NP      Examination chaperoned by KIKI SANTOYO CMA.

## 2024-06-05 LAB
ESTRADIOL SERPL-MCNC: <11 PG/ML
FSH SERPL-ACNC: 83.2 MIU/ML
LH SERPL-ACNC: 36.4 MIU/ML

## 2024-06-14 ENCOUNTER — OFFICE VISIT (OUTPATIENT)
Age: 54
End: 2024-06-14
Payer: COMMERCIAL

## 2024-06-14 VITALS
SYSTOLIC BLOOD PRESSURE: 106 MMHG | HEIGHT: 62 IN | DIASTOLIC BLOOD PRESSURE: 75 MMHG | WEIGHT: 179.6 LBS | BODY MASS INDEX: 33.05 KG/M2

## 2024-06-14 DIAGNOSIS — N95.1 MENOPAUSAL SYMPTOMS: ICD-10-CM

## 2024-06-14 DIAGNOSIS — N95.0 PMB (POSTMENOPAUSAL BLEEDING): Primary | ICD-10-CM

## 2024-06-14 PROCEDURE — G8427 DOCREV CUR MEDS BY ELIG CLIN: HCPCS | Performed by: OBSTETRICS & GYNECOLOGY

## 2024-06-14 PROCEDURE — 3017F COLORECTAL CA SCREEN DOC REV: CPT | Performed by: OBSTETRICS & GYNECOLOGY

## 2024-06-14 PROCEDURE — 1036F TOBACCO NON-USER: CPT | Performed by: OBSTETRICS & GYNECOLOGY

## 2024-06-14 PROCEDURE — 99213 OFFICE O/P EST LOW 20 MIN: CPT | Performed by: OBSTETRICS & GYNECOLOGY

## 2024-06-14 PROCEDURE — G8417 CALC BMI ABV UP PARAM F/U: HCPCS | Performed by: OBSTETRICS & GYNECOLOGY

## 2024-06-14 RX ORDER — ESTRADIOL 2 MG/1
2 TABLET ORAL DAILY
Qty: 90 TABLET | Refills: 3 | Status: SHIPPED | OUTPATIENT
Start: 2024-06-14 | End: 2025-06-09

## 2024-06-14 NOTE — PROGRESS NOTES
Edith Sethi is a 54 y.o. female presents for a problem visit.    Chief Complaint   Patient presents with    Follow-up    Ultrasound     No LMP recorded. Patient is postmenopausal.  Birth Control: post menopausal status.  Last Pap: normal obtained 6/4/024.    The patient is reporting having: Ultrasound Follow Up for PMB.  Ultrasound report:TV ULTRASOUND PERFORMED. UTERUS IS ANTEVERTED, NORMAL IN SIZE AND HETEROGENEOUS IN ECHOGENICITY. ENDOMETRIUM MEASURES 4-5MM IN THICKNESS. NO EVIDENCE OF MASSES OR ABNORMALITIES ARE SEEN. IUD IS SEEN IN THE PROPER POSITION WITHIN THE ENDOMETRIAL CAVITY IN THE UTERINE FUNDUS. RIGHT OVARY APPEARS WITHIN NORMAL LIMITS. LEFT ADNEXA APPEARS WITHIN NORMAL LIMITS. NO FREE FLUID SEEN IN THE CDS.           1. Have you been to the ER, urgent care clinic, or hospitalized since your last visit? No    2. Have you seen or consulted any other health care providers outside of the [facility] since your last visit? No    Examination chaperoned by KIKI SANTOYO CMA.  
    Prior to Admission medications    Medication Sig Start Date End Date Taking? Authorizing Provider   gabapentin (NEURONTIN) 300 MG capsule Take 1 capsule by mouth 3 times daily. Max Daily Amount: 900 mg    Urvashi Purcell MD   methocarbamol (ROBAXIN) 500 MG tablet Take 1 tablet by mouth 4 times daily    Urvashi Purcell MD   DULoxetine (CYMBALTA) 60 MG extended release capsule Take 1 capsule by mouth daily    Urvashi Purcell MD   oxyCODONE (ROXICODONE) 5 MG immediate release tablet Take 1 tablet by mouth every 4 hours as needed for Pain. Max Daily Amount: 30 mg    Urvashi Purcell MD        Review of Systems - History obtained from the patient  Constitutional: negative for weight loss, fever, night sweats  HEENT: negative for hearing loss, earache, congestion, snoring, sorethroat  CV: negative for chest pain, palpitations, edema  Resp: negative for cough, shortness of breath, wheezing  Breast: negative for breast lumps, nipple discharge, galactorrhea  GI: negative for change in bowel habits, abdominal pain, black or bloody stools  : negative for frequency, dysuria, hematuria  MSK: negative for back pain, joint pain, muscle pain  Skin: negative for itching, rash, hives  Neuro: negative for dizziness, headache, confusion, weakness  Psych: negative for anxiety, depression, change in mood  Heme/lymph: negative for bleeding, bruising, pallor      Objective:  /75   Ht 1.575 m (5' 2\")   Wt 81.5 kg (179 lb 9.6 oz)   BMI 32.85 kg/m²     Physical Exam:   PHYSICAL EXAMINATION    Constitutional  Appearance: well-nourished, well developed, alert, in no acute distress    Skin  General Inspection: no rash, no lesions identified    Neurologic/Psychiatric  Mental Status:  Orientation: grossly oriented to person, place and time  Mood and Affect: mood normal, affect appropriate    ASSESSMENT   Diagnosis Orders   1. PMB (postmenopausal bleeding)        2. Menopausal symptoms          PLAN  Start

## 2024-07-08 ENCOUNTER — TELEPHONE (OUTPATIENT)
Age: 54
End: 2024-07-08

## 2024-10-09 ENCOUNTER — OFFICE VISIT (OUTPATIENT)
Age: 54
End: 2024-10-09
Payer: COMMERCIAL

## 2024-10-09 VITALS
BODY MASS INDEX: 32.76 KG/M2 | HEIGHT: 62 IN | HEART RATE: 93 BPM | SYSTOLIC BLOOD PRESSURE: 124 MMHG | OXYGEN SATURATION: 100 % | WEIGHT: 178 LBS | DIASTOLIC BLOOD PRESSURE: 88 MMHG

## 2024-10-09 DIAGNOSIS — R07.9 CHEST PAIN, UNSPECIFIED TYPE: ICD-10-CM

## 2024-10-09 DIAGNOSIS — M05.20 RHEUMATOID ARTERITIS (HCC): ICD-10-CM

## 2024-10-09 DIAGNOSIS — R00.0 TACHYCARDIA: ICD-10-CM

## 2024-10-09 DIAGNOSIS — Z76.89 ESTABLISHING CARE WITH NEW DOCTOR, ENCOUNTER FOR: Primary | ICD-10-CM

## 2024-10-09 LAB
ANION GAP SERPL CALC-SCNC: 5 MMOL/L (ref 2–12)
BASOPHILS # BLD: 0 K/UL (ref 0–0.1)
BASOPHILS NFR BLD: 1 % (ref 0–1)
BUN SERPL-MCNC: 11 MG/DL (ref 6–20)
BUN/CREAT SERPL: 13 (ref 12–20)
CALCIUM SERPL-MCNC: 9.9 MG/DL (ref 8.5–10.1)
CHLORIDE SERPL-SCNC: 104 MMOL/L (ref 97–108)
CHOLEST SERPL-MCNC: 221 MG/DL
CO2 SERPL-SCNC: 30 MMOL/L (ref 21–32)
CREAT SERPL-MCNC: 0.87 MG/DL (ref 0.55–1.02)
DIFFERENTIAL METHOD BLD: NORMAL
EOSINOPHIL # BLD: 0.2 K/UL (ref 0–0.4)
EOSINOPHIL NFR BLD: 4 % (ref 0–7)
ERYTHROCYTE [DISTWIDTH] IN BLOOD BY AUTOMATED COUNT: 14.2 % (ref 11.5–14.5)
GLUCOSE SERPL-MCNC: 101 MG/DL (ref 65–100)
HCT VFR BLD AUTO: 38.1 % (ref 35–47)
HDLC SERPL-MCNC: 63 MG/DL
HDLC SERPL: 3.5 (ref 0–5)
HGB BLD-MCNC: 12.7 G/DL (ref 11.5–16)
IMM GRANULOCYTES # BLD AUTO: 0 K/UL (ref 0–0.04)
IMM GRANULOCYTES NFR BLD AUTO: 0 % (ref 0–0.5)
LDLC SERPL CALC-MCNC: 128 MG/DL (ref 0–100)
LYMPHOCYTES # BLD: 1.9 K/UL (ref 0.8–3.5)
LYMPHOCYTES NFR BLD: 36 % (ref 12–49)
MCH RBC QN AUTO: 29.2 PG (ref 26–34)
MCHC RBC AUTO-ENTMCNC: 33.3 G/DL (ref 30–36.5)
MCV RBC AUTO: 87.6 FL (ref 80–99)
MONOCYTES # BLD: 0.5 K/UL (ref 0–1)
MONOCYTES NFR BLD: 10 % (ref 5–13)
NEUTS SEG # BLD: 2.6 K/UL (ref 1.8–8)
NEUTS SEG NFR BLD: 49 % (ref 32–75)
NRBC # BLD: 0 K/UL (ref 0–0.01)
NRBC BLD-RTO: 0 PER 100 WBC
PLATELET # BLD AUTO: 297 K/UL (ref 150–400)
PMV BLD AUTO: 9.3 FL (ref 8.9–12.9)
POTASSIUM SERPL-SCNC: 4.1 MMOL/L (ref 3.5–5.1)
RBC # BLD AUTO: 4.35 M/UL (ref 3.8–5.2)
SODIUM SERPL-SCNC: 139 MMOL/L (ref 136–145)
TRIGL SERPL-MCNC: 150 MG/DL
TSH SERPL DL<=0.05 MIU/L-ACNC: 0.94 UIU/ML (ref 0.36–3.74)
VLDLC SERPL CALC-MCNC: 30 MG/DL
WBC # BLD AUTO: 5.2 K/UL (ref 3.6–11)

## 2024-10-09 PROCEDURE — 1036F TOBACCO NON-USER: CPT | Performed by: INTERNAL MEDICINE

## 2024-10-09 PROCEDURE — G8484 FLU IMMUNIZE NO ADMIN: HCPCS | Performed by: INTERNAL MEDICINE

## 2024-10-09 PROCEDURE — 99204 OFFICE O/P NEW MOD 45 MIN: CPT | Performed by: INTERNAL MEDICINE

## 2024-10-09 PROCEDURE — 93000 ELECTROCARDIOGRAM COMPLETE: CPT | Performed by: INTERNAL MEDICINE

## 2024-10-09 PROCEDURE — 3017F COLORECTAL CA SCREEN DOC REV: CPT | Performed by: INTERNAL MEDICINE

## 2024-10-09 PROCEDURE — G8427 DOCREV CUR MEDS BY ELIG CLIN: HCPCS | Performed by: INTERNAL MEDICINE

## 2024-10-09 PROCEDURE — G8417 CALC BMI ABV UP PARAM F/U: HCPCS | Performed by: INTERNAL MEDICINE

## 2024-10-09 ASSESSMENT — PATIENT HEALTH QUESTIONNAIRE - PHQ9
SUM OF ALL RESPONSES TO PHQ QUESTIONS 1-9: 0
SUM OF ALL RESPONSES TO PHQ9 QUESTIONS 1 & 2: 0
SUM OF ALL RESPONSES TO PHQ QUESTIONS 1-9: 0
2. FEELING DOWN, DEPRESSED OR HOPELESS: NOT AT ALL
SUM OF ALL RESPONSES TO PHQ QUESTIONS 1-9: 0
SUM OF ALL RESPONSES TO PHQ QUESTIONS 1-9: 0
1. LITTLE INTEREST OR PLEASURE IN DOING THINGS: NOT AT ALL

## 2024-10-09 NOTE — PROGRESS NOTES
Cardiology  Clinic -   New Patient  Visit   Date of Service : 10/9/2024    Name: Edith Sethi  Patient ID: [id number]  Age: 54 y.o. Sex: female YOB: 1970    Author: KIM ELIZONDO MD    PCP: Lotus Armstrong MD    Referring Provider:Lotus Armstrong MD    Reason for Visit / CC:    Chief Complaint   Patient presents with    New Patient    Chest Pain    Shortness of Breath    Dizziness    Tachycardia         ASSESSMENT  AND PLAN           Assessment & Plan  1. Chest pain with occasional atypical and typical symptoms.  Need to rule out worsening CAD. EKG today shows normal sinus rhythm with low voltage QRS. Plan to do an exercise stress echo to rule out ischemia. An echo will also be done to assess left ventricular systolic and diastolic function. If the stress test is normal, follow up with a rheumatologist to rule out musculoskeletal causes of chest pain, including costochondritis. A GI work-up, including upper GI endoscopy, will be needed to rule out GERD.    2. Family history of coronary artery disease.  Sister had CAD and a stent before age 50. Continue aggressive cardiac risk factor modification. Plan to do a lipid panel, CBC, and renal function panel today.    3. Tachycardia with dizziness.  Need to rule out cardiac arrhythmias. Plan to do a Holter monitor for 48 hours.    4. Hyperlipidemia.  Mildly uncontrolled in the past. Plan to do a lipid panel. If cholesterol and LDL are elevated, may consider lipid-lowering therapy.    5. Rheumatoid arthritis diagnosed in 2019.  Refer to rheumatology for further management.    6. Allergy to IVP dye.  Continue to monitor. Avoid CT angiogram and consider alternative imaging techniques.    7. Generalized anxiety disorder.  Diet and exercise counseling provided. Advised to join a physical therapy program.    Follow-up  Return in 3 months for follow up.        Patient advised aggressive risk factor and lifestyle modification- diet and exercise counseling done

## 2024-10-10 NOTE — RESULT ENCOUNTER NOTE
Lipid panel shows LDL cholesterol high at 128 mg/dL  Would recommend initiation of statin due to family history of CAD  Let me know if she is agreeable  Creatinine is normal  TSH normal  Hemoglobin is normal continue same treatment

## 2024-10-11 ENCOUNTER — TELEPHONE (OUTPATIENT)
Age: 54
End: 2024-10-11

## 2024-10-11 NOTE — TELEPHONE ENCOUNTER
*PENDING REVIEW*, Received referral request, referral is pending review from provider will contact patient once we are given the ok to schedule New Patient Soy.

## 2024-10-14 LAB — ECHO BSA: 1.88 M2

## 2024-10-14 NOTE — RESULT ENCOUNTER NOTE
Holter monitor did not show any sustained cardiac arrhythmias rare PACs and PVCs few extra beats continue same treatment

## 2024-10-15 ENCOUNTER — TELEPHONE (OUTPATIENT)
Age: 54
End: 2024-10-15

## 2024-10-15 NOTE — TELEPHONE ENCOUNTER
LVM for patient to call us back to schedule NP ulices with Sheri Theodore, per provider ok to schedule next available NP ulices, advise we are currently booked out for new patients until Feb 2025

## 2024-11-19 ENCOUNTER — TELEPHONE (OUTPATIENT)
Age: 54
End: 2024-11-19

## 2024-11-19 NOTE — TELEPHONE ENCOUNTER
Two patient identifiers used    54 year old patient last seen in the office on 6/4/2024 for ae.    Patient reports she had a bartholin cyst to rupture last night and went to urgent care today.    Patient reports the cyst is on the left side of her vagina and was the size of a quarter but has gone down.    Patient was advised per urgent care to see her gyn for follow up.    Patient was given antibiotics to take, bactrim and keflex and told to use warm compresses and sitz bath with epsom salts.      Patient was placed on the schedule to be seen on 11/22/2024 at 4:30 pm    Patient verbalized understanding.

## 2024-11-22 ENCOUNTER — OFFICE VISIT (OUTPATIENT)
Age: 54
End: 2024-11-22
Payer: COMMERCIAL

## 2024-11-22 VITALS
WEIGHT: 176.8 LBS | BODY MASS INDEX: 32.54 KG/M2 | RESPIRATION RATE: 16 BRPM | HEART RATE: 99 BPM | HEIGHT: 62 IN | DIASTOLIC BLOOD PRESSURE: 67 MMHG | SYSTOLIC BLOOD PRESSURE: 98 MMHG

## 2024-11-22 DIAGNOSIS — N75.0 BARTHOLIN'S GLAND CYST: Primary | ICD-10-CM

## 2024-11-22 PROBLEM — G43.909 HEADACHE, MIGRAINE: Status: RESOLVED | Noted: 2024-06-04 | Resolved: 2024-11-22

## 2024-11-22 PROBLEM — M06.9 RHEUMATOID ARTHRITIS (HCC): Status: ACTIVE | Noted: 2019-06-06

## 2024-11-22 PROCEDURE — 1036F TOBACCO NON-USER: CPT | Performed by: OBSTETRICS & GYNECOLOGY

## 2024-11-22 PROCEDURE — G8427 DOCREV CUR MEDS BY ELIG CLIN: HCPCS | Performed by: OBSTETRICS & GYNECOLOGY

## 2024-11-22 PROCEDURE — G8484 FLU IMMUNIZE NO ADMIN: HCPCS | Performed by: OBSTETRICS & GYNECOLOGY

## 2024-11-22 PROCEDURE — G8417 CALC BMI ABV UP PARAM F/U: HCPCS | Performed by: OBSTETRICS & GYNECOLOGY

## 2024-11-22 PROCEDURE — 3017F COLORECTAL CA SCREEN DOC REV: CPT | Performed by: OBSTETRICS & GYNECOLOGY

## 2024-11-22 PROCEDURE — 99213 OFFICE O/P EST LOW 20 MIN: CPT | Performed by: OBSTETRICS & GYNECOLOGY

## 2024-11-22 RX ORDER — SULFAMETHOXAZOLE AND TRIMETHOPRIM 800; 160 MG/1; MG/1
1 TABLET ORAL 2 TIMES DAILY
COMMUNITY

## 2024-11-22 SDOH — ECONOMIC STABILITY: FOOD INSECURITY: WITHIN THE PAST 12 MONTHS, YOU WORRIED THAT YOUR FOOD WOULD RUN OUT BEFORE YOU GOT MONEY TO BUY MORE.: NEVER TRUE

## 2024-11-22 SDOH — ECONOMIC STABILITY: FOOD INSECURITY: WITHIN THE PAST 12 MONTHS, THE FOOD YOU BOUGHT JUST DIDN'T LAST AND YOU DIDN'T HAVE MONEY TO GET MORE.: NEVER TRUE

## 2024-11-22 SDOH — ECONOMIC STABILITY: INCOME INSECURITY: HOW HARD IS IT FOR YOU TO PAY FOR THE VERY BASICS LIKE FOOD, HOUSING, MEDICAL CARE, AND HEATING?: NOT HARD AT ALL

## 2024-11-22 NOTE — PROGRESS NOTES
Edith Sethi is a 54 y.o. female presents for a problem visit.    Chief Complaint   Patient presents with    bartholin cyst     No LMP recorded. Patient is postmenopausal.  Birth Control: post menopausal status.  Last Pap: normal obtained 6/4/2024.    The patient is reporting having:  follow up bartholin cyst seen Urgent Care 11/19/2024 on left side of vagina given antibiotics.            1. Have you been to the ER, urgent care clinic, or hospitalized since your last visit? Yes    2. Have you seen or consulted any other health care providers outside of the [facility] since your last visit? Yes    Examination chaperoned by KIKI SANTOYO CMA.  
    Socioeconomic History    Marital status:     Number of children: 3   Tobacco Use    Smoking status: Never    Smokeless tobacco: Never   Substance and Sexual Activity    Alcohol use: Not Currently    Drug use: Not Currently     Types: Marijuana (Weed)     Comment: Stopped at 16    Sexual activity: Yes     Partners: Male     Comment:  only     Social Determinants of Health     Financial Resource Strain: Low Risk  (11/22/2024)    Overall Financial Resource Strain (CARDIA)     Difficulty of Paying Living Expenses: Not hard at all   Food Insecurity: No Food Insecurity (11/22/2024)    Hunger Vital Sign     Worried About Running Out of Food in the Last Year: Never true     Ran Out of Food in the Last Year: Never true   Transportation Needs: Unknown (11/22/2024)    PRAPARE - Transportation     Lack of Transportation (Non-Medical): No   Housing Stability: Unknown (11/22/2024)    Housing Stability Vital Sign     Homeless in the Last Year: No      Family History   Adopted: Yes   Problem Relation Age of Onset    Unknown Mother         Don’t know history    Unknown Father         Don’t lnow history    Coronary Art Dis Sister      Current Outpatient Medications on File Prior to Visit   Medication Sig Dispense Refill    sulfamethoxazole-trimethoprim (BACTRIM DS;SEPTRA DS) 800-160 MG per tablet Take 1 tablet by mouth 2 times daily      estradiol (ESTRACE) 2 MG tablet Take 1 tablet by mouth daily 90 tablet 3    gabapentin (NEURONTIN) 300 MG capsule Take 1 capsule by mouth 3 times daily.      methocarbamol (ROBAXIN) 500 MG tablet Take 1 tablet by mouth 4 times daily      DULoxetine (CYMBALTA) 60 MG extended release capsule Take 1 capsule by mouth daily      oxyCODONE (ROXICODONE) 5 MG immediate release tablet Take 1 tablet by mouth every 4 hours as needed for Pain.       No current facility-administered medications on file prior to visit.     Allergies   Allergen Reactions    Barium-Containing Compounds Hives

## 2025-01-24 ENCOUNTER — TELEPHONE (OUTPATIENT)
Age: 55
End: 2025-01-24

## 2025-01-24 NOTE — TELEPHONE ENCOUNTER
PT name and  verified    55 yo last ov 24, last ae 24    PT calling stating she is in menopause and yesterday am , she started with pelvic pain/pressure and she is in a \"tremendous\" amount a pain, a 10/10 on the pain scale. PT reports it difficult to walk and it hurts when she urinates. PT states it feels like \"my uterus is falling out\".  PT has tried Ibuprofen for the pain, minimal relief, and then just back to the pain 10/10.  PT states she is having regular BM's, no vaginal odor, no vaginal dc and no vaginal bleeding.  PT denies any heavy lifting.  RN consulted  and advises PT to go to the ER if she is having that much pain.  RN relayed MD's advice and PT verbalizes understanding.

## 2025-06-05 NOTE — PROGRESS NOTES
Edith Sethi is a 55 y.o. female returns for an annual exam     Chief Complaint   Patient presents with    Annual Exam       No LMP recorded. Patient is postmenopausal.  Her periods are absent in flow.  Problems: no problems  Birth Control: post menopausal status.  Last Pap: normal obtained 1 year(s) ago.  She does not have a history of ELMA 2, 3 or cervical cancer.   Last Mammogram: has not had a recent mammogram.     Last Bone Density: normal obtained 2023.  Last colonoscopy: normal obtained 10/2022.      1. Have you been to the ER, urgent care clinic, or hospitalized since your last visit? Yes    2. Have you seen or consulted any other health care providers outside of the [facility] since your last visit? Yes    Examination chaperoned by KIKI SANTOYO CMA.

## 2025-06-06 ENCOUNTER — OFFICE VISIT (OUTPATIENT)
Age: 55
End: 2025-06-06
Payer: COMMERCIAL

## 2025-06-06 VITALS
WEIGHT: 178.4 LBS | SYSTOLIC BLOOD PRESSURE: 108 MMHG | DIASTOLIC BLOOD PRESSURE: 71 MMHG | HEIGHT: 62 IN | RESPIRATION RATE: 18 BRPM | BODY MASS INDEX: 32.83 KG/M2 | HEART RATE: 82 BPM

## 2025-06-06 DIAGNOSIS — Z12.31 BREAST CANCER SCREENING BY MAMMOGRAM: ICD-10-CM

## 2025-06-06 DIAGNOSIS — N95.1 MENOPAUSAL SYMPTOMS: ICD-10-CM

## 2025-06-06 DIAGNOSIS — Z01.419 WELL WOMAN EXAM WITH ROUTINE GYNECOLOGICAL EXAM: Primary | ICD-10-CM

## 2025-06-06 PROBLEM — G89.29 CHRONIC PELVIC PAIN IN FEMALE: Status: RESOLVED | Noted: 2025-02-20 | Resolved: 2025-06-06

## 2025-06-06 PROBLEM — N94.819 VULVODYNIA: Status: RESOLVED | Noted: 2025-02-20 | Resolved: 2025-06-06

## 2025-06-06 PROBLEM — R10.2 CHRONIC PELVIC PAIN IN FEMALE: Status: RESOLVED | Noted: 2025-02-20 | Resolved: 2025-06-06

## 2025-06-06 PROBLEM — F32.A DEPRESSION: Status: RESOLVED | Noted: 2024-06-04 | Resolved: 2025-06-06

## 2025-06-06 PROBLEM — B02.9 ZOSTER WITHOUT COMPLICATIONS: Status: RESOLVED | Noted: 2020-10-02 | Resolved: 2025-06-06

## 2025-06-06 PROCEDURE — 99396 PREV VISIT EST AGE 40-64: CPT | Performed by: OBSTETRICS & GYNECOLOGY

## 2025-06-06 SDOH — ECONOMIC STABILITY: FOOD INSECURITY: WITHIN THE PAST 12 MONTHS, YOU WORRIED THAT YOUR FOOD WOULD RUN OUT BEFORE YOU GOT MONEY TO BUY MORE.: SOMETIMES TRUE

## 2025-06-06 SDOH — ECONOMIC STABILITY: FOOD INSECURITY: WITHIN THE PAST 12 MONTHS, THE FOOD YOU BOUGHT JUST DIDN'T LAST AND YOU DIDN'T HAVE MONEY TO GET MORE.: SOMETIMES TRUE

## 2025-06-06 NOTE — PROGRESS NOTES
ANNUAL EXAM AGES 40-64      History:  Edith Sethi is a 55 y.o. year old  Black /  female   No LMP recorded. Patient is postmenopausal.    She presents for her annual checkup.     Nurse Notes:  No LMP recorded. Patient is postmenopausal.  Her periods are absent in flow.  Problems: no problems  Birth Control: post menopausal status.  Last Pap: normal obtained 1 year(s) ago.  She does not have a history of ELMA 2, 3 or cervical cancer.   Last Mammogram: has not had a recent mammogram.     Last Bone Density: normal obtained .  Last colonoscopy: normal obtained 10/2022.    History of Present Illness  The patient is a 55-year-old female who presents for a checkup.    She reports no significant health issues, particularly in relation to her gynecological health. Menopausal symptoms are being managed without medication due to adverse gastrointestinal reactions to previous treatments. A history of gastrointestinal issues has been effectively managed with prebiotics and probiotics. Sleep quality is suboptimal, and she experiences hot flashes, which are managed with a neck fan and other cooling measures. Estrace was discontinued due to associated stomach pain. No instances of bleeding are reported. Her last mammogram was conducted in  at [facility]. A colonoscopy was performed in , with a recommendation for a repeat procedure in 10 years.    She has been dealing with rheumatoid arthritis and feels like she has it under control. Pain is occasionally triggered by certain foods, but for the most part, she does not experience significant pain. Different supplements have been taken for a few years, which have been helpful.    GYNECOLOGICAL HISTORY:   Gynecology History discussed    PAST MEDICAL HISTORY:   Rheumatoid arthritis    SOCIAL HISTORY  She has been  for 19 years.    Problem List:  Patient Active Problem List    Diagnosis Date Noted    Menopausal symptoms 2024

## 2025-06-12 LAB
CYTOLOGIST CVX/VAG CYTO: ABNORMAL
CYTOLOGY CVX/VAG DOC CYTO: ABNORMAL
CYTOLOGY CVX/VAG DOC THIN PREP: ABNORMAL
DX ICD CODE: ABNORMAL
DX ICD CODE: ABNORMAL
HPV GENOTYPE REFLEX: ABNORMAL
HPV I/H RISK 4 DNA CVX QL PROBE+SIG AMP: NEGATIVE
OTHER STN SPEC: ABNORMAL
PATHOLOGIST CVX/VAG CYTO: ABNORMAL
RECOM F/U CVX/VAG CYTO: ABNORMAL
SERVICE CMNT-IMP: ABNORMAL
STAT OF ADQ CVX/VAG CYTO-IMP: ABNORMAL

## 2025-06-18 ENCOUNTER — RESULTS FOLLOW-UP (OUTPATIENT)
Age: 55
End: 2025-06-18

## 2025-06-18 PROBLEM — R87.610 ASCUS OF CERVIX WITH NEGATIVE HIGH RISK HPV: Status: ACTIVE | Noted: 2025-06-18
